# Patient Record
Sex: FEMALE | URBAN - METROPOLITAN AREA
[De-identification: names, ages, dates, MRNs, and addresses within clinical notes are randomized per-mention and may not be internally consistent; named-entity substitution may affect disease eponyms.]

---

## 2017-06-19 ENCOUNTER — APPOINTMENT (OUTPATIENT)
Age: 27
Setting detail: DERMATOLOGY
End: 2017-06-22

## 2017-06-19 DIAGNOSIS — L63.8 OTHER ALOPECIA AREATA: ICD-10-CM

## 2017-06-19 DIAGNOSIS — L30.9 DERMATITIS, UNSPECIFIED: ICD-10-CM

## 2017-06-19 PROBLEM — L85.3 XEROSIS CUTIS: Status: ACTIVE | Noted: 2017-06-19

## 2017-06-19 PROCEDURE — OTHER FOLLOW UP FOR NEXT VISIT: OTHER

## 2017-06-19 PROCEDURE — 99214 OFFICE O/P EST MOD 30 MIN: CPT

## 2017-06-19 PROCEDURE — OTHER TREATMENT REGIMEN: OTHER

## 2017-06-19 PROCEDURE — OTHER MIPS QUALITY: OTHER

## 2017-06-19 ASSESSMENT — LOCATION ZONE DERM
LOCATION ZONE: SCALP
LOCATION ZONE: LIP

## 2017-06-19 ASSESSMENT — LOCATION DETAILED DESCRIPTION DERM
LOCATION DETAILED: RIGHT INFERIOR VERMILION LIP
LOCATION DETAILED: RIGHT SUPERIOR VERMILION LIP
LOCATION DETAILED: LEFT SUPERIOR PARIETAL SCALP

## 2017-06-19 ASSESSMENT — BSA RASH: BSA RASH: 1

## 2017-06-19 ASSESSMENT — LOCATION SIMPLE DESCRIPTION DERM
LOCATION SIMPLE: RIGHT LIP
LOCATION SIMPLE: SCALP

## 2017-06-19 ASSESSMENT — SEVERITY ASSESSMENT: SEVERITY: MILD TO MODERATE

## 2017-06-19 ASSESSMENT — PAIN INTENSITY VAS: HOW INTENSE IS YOUR PAIN 0 BEING NO PAIN, 10 BEING THE MOST SEVERE PAIN POSSIBLE?: 1/10 PAIN

## 2018-05-04 NOTE — PROCEDURE: TREATMENT REGIMEN
Plan: There is no visible evidence of any hair loss today however patient describes somewhat random reoccurrences of hair loss from time to time on the scalp. The Topicort sample worked well so she would like more medication. Overall it seems to be fairly easy to control with a topical medication so we will continue that for now but patient will follow up if any worsening. I cannot explain to the patient why she seems to continue to have episodes of this problem
Detail Level: Detailed
Detail Level: Simple
Plan: This could be a sensitivity to a toothpaste or mouthwash product. (Try Pepsodent). I will email her some suggestions to change her toothpaste to a different product to see if this helps. She can use over-the-counter Cortisone cream twice daily for 3 to 5 days as needed, continue ongoing lip care with Aquaphor or Blistex. Patient will call or follow up if any worsening of the problem, or no improvement for further treatment recommendations
ambulatory

## 2021-03-30 NOTE — PROCEDURE: FOLLOW UP FOR NEXT VISIT
Instructions (Optional): If no improvement or any worsening
Scheduled For Follow Up In (Optional): prn
Detail Level: Simple
Detail Level: Detailed
Scheduled For Follow Up In (Optional): 6 mos
Use Enhanced Medication Counseling?: No

## 2025-01-22 NOTE — PROCEDURE: MIPS QUALITY
Contacted patient to schedule colonoscopy but unavailable. Voicemail left to contact the office.     
Quality 111:Pneumonia Vaccination Status For Older Adults: Pneumococcal Vaccination not Administered or Previously Received, Reason not Otherwise Specified
Quality 47: Advance Care Plan: Advance Care Planning discussed and documented in the medical record; patient did not wish or was not able to name a surrogate decision maker or provide an advance care plan.
Detail Level: Detailed
Quality 110: Preventive Care And Screening: Influenza Immunization: Influenza Immunization previously received during influenza season